# Patient Record
Sex: MALE | Race: WHITE
[De-identification: names, ages, dates, MRNs, and addresses within clinical notes are randomized per-mention and may not be internally consistent; named-entity substitution may affect disease eponyms.]

---

## 2018-04-11 ENCOUNTER — HOSPITAL ENCOUNTER (OUTPATIENT)
Age: 43
Discharge: HOME | End: 2018-04-11

## 2018-04-11 ENCOUNTER — HOSPITAL ENCOUNTER (OUTPATIENT)
Dept: HOSPITAL 91 - SDS | Age: 43
Discharge: HOME | End: 2018-04-11
Payer: COMMERCIAL

## 2018-04-11 DIAGNOSIS — J45.909: ICD-10-CM

## 2018-04-11 DIAGNOSIS — I10: ICD-10-CM
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
## 2018-04-11 DIAGNOSIS — G56.02: ICD-10-CM

## 2018-04-11 DIAGNOSIS — S52.572A: Primary | ICD-10-CM

## 2018-04-11 PROCEDURE — 25609 OPTX DST RD XART FX/EP SEP3+: CPT

## 2018-04-11 PROCEDURE — 73090 X-RAY EXAM OF FOREARM: CPT

## 2018-04-11 RX ADMIN — OXYCODONE HYDROCHLORIDE AND ACETAMINOPHEN 1 TAB: 5; 325 TABLET ORAL at 19:53

## 2018-04-11 RX ADMIN — BACITRACIN 1 ML: 50000 INJECTION, POWDER, FOR SOLUTION INTRAMUSCULAR at 00:00

## 2021-07-28 ENCOUNTER — HOSPITAL ENCOUNTER (INPATIENT)
Dept: HOSPITAL 12 - ER | Age: 46
LOS: 2 days | Discharge: HOME | DRG: 203 | End: 2021-07-30
Payer: MEDICAID

## 2021-07-28 VITALS — HEIGHT: 65 IN | WEIGHT: 200 LBS | BODY MASS INDEX: 33.32 KG/M2

## 2021-07-28 VITALS — SYSTOLIC BLOOD PRESSURE: 146 MMHG | DIASTOLIC BLOOD PRESSURE: 95 MMHG

## 2021-07-28 DIAGNOSIS — I69.354: ICD-10-CM

## 2021-07-28 DIAGNOSIS — Z87.820: ICD-10-CM

## 2021-07-28 DIAGNOSIS — R07.89: Primary | ICD-10-CM

## 2021-07-28 DIAGNOSIS — Z71.3: ICD-10-CM

## 2021-07-28 DIAGNOSIS — F41.9: ICD-10-CM

## 2021-07-28 DIAGNOSIS — Z87.891: ICD-10-CM

## 2021-07-28 DIAGNOSIS — I10: ICD-10-CM

## 2021-07-28 DIAGNOSIS — E78.5: ICD-10-CM

## 2021-07-28 DIAGNOSIS — G40.909: ICD-10-CM

## 2021-07-28 DIAGNOSIS — E66.9: ICD-10-CM

## 2021-07-28 LAB
ALP SERPL-CCNC: 75 U/L (ref 50–136)
ALT SERPL W/O P-5'-P-CCNC: 20 U/L (ref 16–63)
AST SERPL-CCNC: 16 U/L (ref 15–37)
BILIRUB DIRECT SERPL-MCNC: 0.1 MG/DL (ref 0–0.2)
BILIRUB SERPL-MCNC: 0.2 MG/DL (ref 0.2–1)
BUN SERPL-MCNC: 26 MG/DL (ref 7–18)
CHLORIDE SERPL-SCNC: 105 MMOL/L (ref 98–107)
CO2 SERPL-SCNC: 28 MMOL/L (ref 21–32)
CREAT SERPL-MCNC: 0.7 MG/DL (ref 0.6–1.3)
GLUCOSE SERPL-MCNC: 97 MG/DL (ref 74–106)
HCT VFR BLD AUTO: 40.7 % (ref 36.7–47.1)
MCH RBC QN AUTO: 28.2 UUG (ref 23.8–33.4)
MCV RBC AUTO: 84.2 FL (ref 73–96.2)
PLATELET # BLD AUTO: 257 K/UL (ref 152–348)
POTASSIUM SERPL-SCNC: 4.2 MMOL/L (ref 3.5–5.1)
WS STN SPEC: 7.6 G/DL (ref 6.4–8.2)

## 2021-07-28 PROCEDURE — A4663 DIALYSIS BLOOD PRESSURE CUFF: HCPCS

## 2021-07-28 PROCEDURE — G0378 HOSPITAL OBSERVATION PER HR: HCPCS

## 2021-07-28 NOTE — NUR
Report recieved from BRANNON Christiansen. Pt. resting in bed, denies any changes in 
symptoms. No signs of distress. Will continue to monitor.

## 2021-07-29 VITALS — DIASTOLIC BLOOD PRESSURE: 83 MMHG | SYSTOLIC BLOOD PRESSURE: 126 MMHG

## 2021-07-29 VITALS — DIASTOLIC BLOOD PRESSURE: 76 MMHG | SYSTOLIC BLOOD PRESSURE: 136 MMHG

## 2021-07-29 VITALS — DIASTOLIC BLOOD PRESSURE: 67 MMHG | SYSTOLIC BLOOD PRESSURE: 120 MMHG

## 2021-07-29 VITALS — SYSTOLIC BLOOD PRESSURE: 136 MMHG | DIASTOLIC BLOOD PRESSURE: 69 MMHG

## 2021-07-29 LAB
BUN SERPL-MCNC: 26 MG/DL (ref 7–18)
CHLORIDE SERPL-SCNC: 107 MMOL/L (ref 98–107)
CHOLEST SERPL-MCNC: 150 MG/DL (ref ?–200)
CO2 SERPL-SCNC: 29 MMOL/L (ref 21–32)
CREAT SERPL-MCNC: 0.8 MG/DL (ref 0.6–1.3)
GLUCOSE SERPL-MCNC: 97 MG/DL (ref 74–106)
HCT VFR BLD AUTO: 39 % (ref 36.7–47.1)
HDLC SERPL-MCNC: 48 MG/DL (ref 40–60)
MAGNESIUM SERPL-MCNC: 2.1 MG/DL (ref 1.8–2.4)
MCH RBC QN AUTO: 28.5 UUG (ref 23.8–33.4)
MCV RBC AUTO: 84.7 FL (ref 73–96.2)
PHOSPHATE SERPL-MCNC: 4.5 MG/DL (ref 2.5–4.9)
PLATELET # BLD AUTO: 249 K/UL (ref 152–348)
POTASSIUM SERPL-SCNC: 4.8 MMOL/L (ref 3.5–5.1)
TRIGL SERPL-MCNC: 170 MG/DL (ref 30–150)

## 2021-07-29 RX ADMIN — DIVALPROEX SODIUM SCH MG: 500 TABLET, DELAYED RELEASE ORAL at 09:48

## 2021-07-29 RX ADMIN — Medication PRN MG: at 09:54

## 2021-07-29 RX ADMIN — GABAPENTIN SCH MG: 100 CAPSULE ORAL at 09:47

## 2021-07-29 RX ADMIN — Medication SCH MG: at 09:47

## 2021-07-29 RX ADMIN — Medication PRN MG: at 21:52

## 2021-07-29 RX ADMIN — CLOPIDOGREL BISULFATE SCH MG: 75 TABLET ORAL at 09:48

## 2021-07-29 RX ADMIN — GABAPENTIN SCH MG: 100 CAPSULE ORAL at 17:01

## 2021-07-29 RX ADMIN — DIVALPROEX SODIUM SCH MG: 500 TABLET, DELAYED RELEASE ORAL at 17:01

## 2021-07-29 RX ADMIN — Medication SCH MG: at 09:49

## 2021-07-29 RX ADMIN — ASPIRIN SCH MG: 81 TABLET, CHEWABLE ORAL at 09:44

## 2021-07-29 RX ADMIN — ATORVASTATIN CALCIUM SCH MG: 20 TABLET, FILM COATED ORAL at 09:54

## 2021-07-29 NOTE — NUR
Patient received in bed, alert and oriented x4. Patient has no c/o chest pain or other 
discomforts at this time. Patient SR on monitor with HR 81. Patient on RA with no SOB or 
difficulties breathing. No acute distress noted. Left FA IV is intact and patent with no 
redness or swelling at this time. Call light and personal belongings within easy reach. Will 
continue to monitor.

## 2021-07-29 NOTE — NUR
Patient complains of chest pain and morphine was administered as ordered. Patient states 
that it relieved his chest pain. No other discomforts reported at this time. No acute 
distress noted. Call light and personal belongings within easy reach. Will continue to 
monitor.
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
## 2021-07-29 NOTE — NUR
Safety and comfort measures maintained T/O shift. Good urine output. Afebrile. All meds 
given as ordered. All needs met.

## 2021-07-30 ENCOUNTER — HOSPITAL ENCOUNTER (OUTPATIENT)
Dept: HOSPITAL 54 - CATHLAB | Age: 46
Discharge: HOME | End: 2021-07-30
Attending: INTERNAL MEDICINE
Payer: MEDICAID

## 2021-07-30 VITALS — SYSTOLIC BLOOD PRESSURE: 128 MMHG | DIASTOLIC BLOOD PRESSURE: 88 MMHG

## 2021-07-30 VITALS — SYSTOLIC BLOOD PRESSURE: 126 MMHG | DIASTOLIC BLOOD PRESSURE: 91 MMHG

## 2021-07-30 VITALS — SYSTOLIC BLOOD PRESSURE: 133 MMHG | DIASTOLIC BLOOD PRESSURE: 86 MMHG

## 2021-07-30 VITALS — SYSTOLIC BLOOD PRESSURE: 147 MMHG | DIASTOLIC BLOOD PRESSURE: 89 MMHG

## 2021-07-30 VITALS — WEIGHT: 180 LBS | HEIGHT: 65 IN | BODY MASS INDEX: 29.99 KG/M2

## 2021-07-30 VITALS — SYSTOLIC BLOOD PRESSURE: 110 MMHG | DIASTOLIC BLOOD PRESSURE: 65 MMHG

## 2021-07-30 DIAGNOSIS — J98.4: Primary | ICD-10-CM

## 2021-07-30 DIAGNOSIS — J84.10: ICD-10-CM

## 2021-07-30 DIAGNOSIS — M47.819: ICD-10-CM

## 2021-07-30 PROCEDURE — 75574 CT ANGIO HRT W/3D IMAGE: CPT

## 2021-07-30 RX ADMIN — DIVALPROEX SODIUM SCH MG: 500 TABLET, DELAYED RELEASE ORAL at 17:13

## 2021-07-30 RX ADMIN — GABAPENTIN SCH MG: 100 CAPSULE ORAL at 08:42

## 2021-07-30 RX ADMIN — Medication PRN MG: at 08:40

## 2021-07-30 RX ADMIN — Medication SCH MG: at 08:42

## 2021-07-30 RX ADMIN — ASPIRIN SCH MG: 81 TABLET, CHEWABLE ORAL at 08:41

## 2021-07-30 RX ADMIN — Medication PRN MG: at 14:07

## 2021-07-30 RX ADMIN — Medication PRN MG: at 11:55

## 2021-07-30 RX ADMIN — Medication SCH MG: at 08:41

## 2021-07-30 RX ADMIN — CLOPIDOGREL BISULFATE SCH MG: 75 TABLET ORAL at 08:41

## 2021-07-30 RX ADMIN — DIVALPROEX SODIUM SCH MG: 500 TABLET, DELAYED RELEASE ORAL at 08:41

## 2021-07-30 RX ADMIN — GABAPENTIN SCH MG: 100 CAPSULE ORAL at 17:13

## 2021-07-30 RX ADMIN — Medication PRN MG: at 12:00

## 2021-07-30 RX ADMIN — ATORVASTATIN CALCIUM SCH MG: 20 TABLET, FILM COATED ORAL at 08:41

## 2021-07-30 RX ADMIN — Medication PRN MG: at 12:05

## 2021-07-30 RX ADMIN — Medication PRN MG: at 12:07

## 2021-07-30 NOTE — NUR
Patient received in bed, alert and oriented x4. Patient has no c/o chest pain or other 
discomforts at this time. Patient SR on monitor. Patient on RA with no SOB or difficulties 
breathing. No acute distress noted. Left FA IV is intact and patent with no redness or 
swelling at this time. Call light and personal belongings within easy reach. Will continue 
to monitor.

## 2021-07-30 NOTE — NUR
Pt slept throughout the night. No discomfort noted. Pt pleasant and able to make needs 
known. Consent signed for CTA chest and in chart.  at 10:30H. Safety and comfort 
provided. No other issues or concerns at this time, will endorse to day shift.

## 2021-07-30 NOTE — NUR
Patient is back from [facility] via ambulance. Patient put back on monitor. Set up patient for 
lunch. No c/o pain or discomforts at this time. No acute distress noted. Will continue to 
monitor.

## 2021-07-30 NOTE — NUR
Patient picked up by ambulance in satisfactory condition for cardiac CTA at [facility].

## 2021-07-30 NOTE — NUR
Taken down via wheelchair with all his personal belongings. Expressed understanding of 
discharge orders. Patient discharged in satisfactory condition.

## 2021-07-30 NOTE — NUR
Patient's IV was flushed to ensure patency for contrast. Patient states he has new pain at 
the IV site. Therefore, new IV started on the left AC 20G C/D/I. Patient is ready to be 
transported to [facility] for cardiac CTA.  Ger Hilliard aware. Will continue to 
monitor.